# Patient Record
Sex: MALE | Race: BLACK OR AFRICAN AMERICAN | NOT HISPANIC OR LATINO | Employment: UNEMPLOYED | URBAN - METROPOLITAN AREA
[De-identification: names, ages, dates, MRNs, and addresses within clinical notes are randomized per-mention and may not be internally consistent; named-entity substitution may affect disease eponyms.]

---

## 2024-03-11 ENCOUNTER — HOSPITAL ENCOUNTER (OUTPATIENT)
Dept: RADIOLOGY | Facility: HOSPITAL | Age: 39
Discharge: HOME | End: 2024-03-11
Attending: PODIATRIST
Payer: OTHER GOVERNMENT

## 2024-03-11 ENCOUNTER — OFFICE VISIT (OUTPATIENT)
Dept: PODIATRY | Facility: CLINIC | Age: 39
End: 2024-03-11
Payer: OTHER GOVERNMENT

## 2024-03-11 DIAGNOSIS — Z78.9 UNDER CARE OF PAIN MANAGEMENT SPECIALIST: ICD-10-CM

## 2024-03-11 DIAGNOSIS — M54.17 LUMBOSACRAL RADICULOPATHY: Primary | ICD-10-CM

## 2024-03-11 DIAGNOSIS — M25.572 PAIN IN LEFT ANKLE AND JOINTS OF LEFT FOOT: ICD-10-CM

## 2024-03-11 DIAGNOSIS — M47.816 LUMBAR SPONDYLOSIS: ICD-10-CM

## 2024-03-11 DIAGNOSIS — L30.9 DERMATITIS: ICD-10-CM

## 2024-03-11 DIAGNOSIS — M54.17 LUMBOSACRAL RADICULOPATHY: ICD-10-CM

## 2024-03-11 DIAGNOSIS — M25.571 PAIN IN RIGHT ANKLE AND JOINTS OF RIGHT FOOT: ICD-10-CM

## 2024-03-11 DIAGNOSIS — M72.2 PLANTAR FASCIITIS: ICD-10-CM

## 2024-03-11 DIAGNOSIS — B35.3 TINEA PEDIS OF BOTH FEET: ICD-10-CM

## 2024-03-11 DIAGNOSIS — M51.36 BULGING OF LUMBAR INTERVERTEBRAL DISC: ICD-10-CM

## 2024-03-11 PROCEDURE — 99203 OFFICE O/P NEW LOW 30 MIN: CPT | Mod: PBBFAC,25 | Performed by: PODIATRIST

## 2024-03-11 PROCEDURE — 73630 X-RAY EXAM OF FOOT: CPT | Mod: TC,50

## 2024-03-11 PROCEDURE — 99999 PR PBB SHADOW E&M-NEW PATIENT-LVL III: CPT | Mod: PBBFAC,,, | Performed by: PODIATRIST

## 2024-03-11 PROCEDURE — 73630 X-RAY EXAM OF FOOT: CPT | Mod: 26,50,, | Performed by: RADIOLOGY

## 2024-03-11 PROCEDURE — 99205 OFFICE O/P NEW HI 60 MIN: CPT | Mod: S$PBB,,, | Performed by: PODIATRIST

## 2024-03-11 PROCEDURE — 72100 X-RAY EXAM L-S SPINE 2/3 VWS: CPT | Mod: 26,,, | Performed by: RADIOLOGY

## 2024-03-11 PROCEDURE — 72100 X-RAY EXAM L-S SPINE 2/3 VWS: CPT | Mod: TC

## 2024-03-11 RX ORDER — BETAMETHASONE VALERATE 1 MG/G
CREAM TOPICAL 2 TIMES DAILY
Qty: 45 G | Refills: 1 | Status: SHIPPED | OUTPATIENT
Start: 2024-03-11 | End: 2024-03-25

## 2024-03-11 RX ORDER — TERBINAFINE HYDROCHLORIDE 250 MG/1
250 TABLET ORAL DAILY
Qty: 42 TABLET | Refills: 0 | Status: SHIPPED | OUTPATIENT
Start: 2024-03-11 | End: 2024-04-22

## 2024-03-11 NOTE — PROGRESS NOTES
Subjective:       Patient ID: Zachariah Whitmore is a 38 y.o. male.    Chief Complaint: Foot Problem (Foot problem, rates pain 5, nondiabetic, )    HPI: Zachariah Whitmore presents to the clinic today with the complaint of persistent severe panis to the B/L LE, LLE greater than RLE. Also states burning and tingling to the B/L lower extremity. Patient states these symptoms have been on going now for the past several months, and are worsening. Patient states the symptoms seem to be moreso nocturnal. Pain/Discomfort is rated at approx. 5/10. Pain/Discomfort is described as an irritant, but is occasionally sharp and shooting like. Patient does have a history of lumbosacral pathology; spinal stenosis, radiculopathy, sciatica. Patient is not a DMI or DMII. Patient is currently on Gabapentin, but only takes the medication as needed. Is retired [employer]. States a history of left knee surgery. States a history of several LOREN to the LSpine. Patient states no trauma. No recent EMG/NCV is stated.     Review of patient's allergies indicates:  No Known Allergies    No past medical history on file.    No family history on file.    Social History     Socioeconomic History    Marital status: Single       No past surgical history on file.    Review of Systems   Constitutional:  Negative for chills, fatigue and fever.   HENT:  Negative for hearing loss.    Eyes:  Negative for photophobia and visual disturbance.   Respiratory:  Negative for cough, chest tightness, shortness of breath and wheezing.    Cardiovascular:  Negative for chest pain and palpitations.   Gastrointestinal:  Negative for constipation, diarrhea, nausea and vomiting.   Endocrine: Negative for cold intolerance and heat intolerance.   Genitourinary:  Negative for flank pain.   Musculoskeletal:  Positive for arthralgias and back pain. Negative for neck pain and neck stiffness.   Neurological:  Negative for light-headedness and headaches.        Neuritis    Psychiatric/Behavioral:  Negative  for sleep disturbance.           Objective:   There were no vitals taken for this visit.    No image results found.       Physical Exam    LOWER EXTREMITY PHYSICAL EXAMINATION  DERMATOLOGY: Skin is supple, dry and intact. Tinea pedis is noted    VASCULAR: The B/L LE dorsalis pedis pulse is 2/4 and the posterior tibial pulse is 2/4. Hair growth is noted on the dorsal foot and digits. Proximal to distal, warm to warm. Capillary refill time is WNL at less than 3s.    ORTHOPEDIC: There is mild tenderness to palpation of the area around the plantar medial calcaneal tubercle on the left foot. Pains are characterized as sharp and stabbing-like with direct palpation of the area. There is also mild pain to palpation of the immediate plantar aspect of the heel, and no pains to the lateral band of the fascia. No edema is noted. No fullness is noted. There are moderate pains to palpation within the plantar fascia at the arch. No defects are noted within the plantar fascia at the arch. No plantar fibromas are noted. No defects are noted within the ligament. Dorsiflexion of the MTPJs with simultaneous palpation of the fascia at the arch, does no worsen and exacerbate the pains. Plantarflexion of the MTPJs with simultaneous palpation of the fascia at the arch, does worsen and exacerbate the pains.  No pains with medial to lateral compression of the heel. Equinus contracture is noted. Antalgic gait pattern is noted.     NEUROLOGY: Proprioception is intact, bilateral. Sensation to light touch is intact. Positive Tinel's Sign/Valleix sign. No neurological sensations with compression of the area of Andrade's Nerve in the area of the Abductor Hallucis muscle belly.    Assessment:     1. Lumbosacral radiculopathy    2. Lumbar spondylosis    3. Bulging of lumbar intervertebral disc    4. Pain in left ankle and joints of left foot    5. Pain in right ankle and joints of right foot    6. Plantar fasciitis    7. Tinea pedis of both feet     8. Dermatitis    9. Under care of pain management specialist        Plan:     Lumbosacral radiculopathy  -     X-Ray Lumbar Spine 2 Or 3 Views; Future; Expected date: 03/11/2024  -     Nerve conduction test; Future; Expected date: 04/11/2024    Lumbar spondylosis  -     X-Ray Lumbar Spine 2 Or 3 Views; Future; Expected date: 03/11/2024  -     Nerve conduction test; Future; Expected date: 04/11/2024    Bulging of lumbar intervertebral disc    Pain in left ankle and joints of left foot  -     X-Ray Foot Complete Bilateral; Future; Expected date: 03/11/2024    Pain in right ankle and joints of right foot  -     X-Ray Foot Complete Bilateral; Future; Expected date: 03/11/2024    Plantar fasciitis  -     X-Ray Foot Complete Bilateral; Future; Expected date: 03/11/2024    Tinea pedis of both feet  -     terbinafine HCL (LAMISIL) 250 mg tablet; Take 1 tablet (250 mg total) by mouth once daily.  Dispense: 42 tablet; Refill: 0    Dermatitis  -     betamethasone valerate 0.1% (VALISONE) 0.1 % Crea; Apply topically 2 (two) times daily. for 14 days  Dispense: 45 g; Refill: 1    Under care of pain management specialist              Future Appointments   Date Time Provider Department Center   3/11/2024 11:45 AM JOSE DE JESUS WHITE-DR [facility]

## 2024-03-21 ENCOUNTER — OFFICE VISIT (OUTPATIENT)
Dept: PHYSICAL MEDICINE AND REHAB | Facility: CLINIC | Age: 39
End: 2024-03-21
Payer: OTHER GOVERNMENT

## 2024-03-21 VITALS — HEIGHT: 66 IN | BODY MASS INDEX: 30.53 KG/M2 | WEIGHT: 190 LBS | RESPIRATION RATE: 13 BRPM

## 2024-03-21 DIAGNOSIS — M54.17 LUMBOSACRAL RADICULOPATHY: ICD-10-CM

## 2024-03-21 DIAGNOSIS — M54.17 LUMBOSACRAL RADICULOPATHY: Primary | ICD-10-CM

## 2024-03-21 PROCEDURE — 95908 NRV CNDJ TST 3-4 STUDIES: CPT | Mod: 26,S$PBB,, | Performed by: PHYSICAL MEDICINE & REHABILITATION

## 2024-03-21 PROCEDURE — 95885 MUSC TST DONE W/NERV TST LIM: CPT | Mod: 26,S$PBB,, | Performed by: PHYSICAL MEDICINE & REHABILITATION

## 2024-03-21 PROCEDURE — 95886 MUSC TEST DONE W/N TEST COMP: CPT | Mod: PBBFAC | Performed by: PHYSICAL MEDICINE & REHABILITATION

## 2024-03-21 PROCEDURE — 99499 UNLISTED E&M SERVICE: CPT | Mod: S$PBB,,, | Performed by: PHYSICAL MEDICINE & REHABILITATION

## 2024-03-21 PROCEDURE — 95908 NRV CNDJ TST 3-4 STUDIES: CPT | Mod: PBBFAC | Performed by: PHYSICAL MEDICINE & REHABILITATION

## 2024-03-21 PROCEDURE — 99999 PR PBB SHADOW E&M-EST. PATIENT-LVL III: CPT | Mod: PBBFAC,,, | Performed by: PHYSICAL MEDICINE & REHABILITATION

## 2024-03-21 PROCEDURE — 99213 OFFICE O/P EST LOW 20 MIN: CPT | Mod: PBBFAC,25 | Performed by: PHYSICAL MEDICINE & REHABILITATION

## 2024-03-21 NOTE — PROGRESS NOTES
[facility]   [id number]  [place] [address]  Phone: [phone]        Full Name: Zachariah Whitmore YOB: 1985  Patient ID: [id number]      Visit Date: 3/21/2024 14:59  Age: 38 Years 6 Months Old  Examining Physician: Rylie Jensen M.D.  Referring Physician:   Reason for Referral: lower ext        Chief Complaint   Patient presents with    Back Pain     Into legs       HPI: This is a 38 y.o.  male being seen in clinic today for evaluation of bilateral lower ext pain with numbness/tingling/burning with radiating into his post leg to the bottom of his foot. His left leg is worse than right.  His ymptoms are worse with prolonged activity and at rest.  Injections provide relief for a while.    History obtained from patient    Past family, medical, social, and surgical history reviewed in chart    Review of Systems:     General- denies lethargy, weight change, fever, chills  Head/neck- denies swallowing difficulties  ENT- denies hearing changes  Cardiovascular-denies chest pain  Pulmonary- denies shortness of breath  GI- denies constipation or bowel incontinence  - denies bladder incontinence  Skin- denies wounds or rashes  Musculoskeletal- denies weakness, + pain  Neurologic- + numbness and tingling  Psychiatric- denies depressive or psychotic features, denies anxiety  Lymphatic-denies swelling  Endocrine- denies hypoglycemic symptoms/DM history  All other pertinent systems negative     Physical Examination:  General: Well developed, well nourished male, NAD  HEENT:NCAT EOMI bilaterally   Pulmonary:Normal respirations    Spinal Examination: CERVICAL  Active ROM is within normal limits.  Inspection: No deformity of spinal alignment.    Spinal Examination: LUMBAR or THORACIC  Active ROM is within normal limits.  Inspection: No deformity of spinal alignment.    Slr neg bilaterally    Bilateral Upper and Lower Extremities:  Pulses are 2+ at radial  bilaterally.  Shoulder/Elbow/Wrist/Hand ROM   Hip/Knee/Ankle ROM wnl  Bilateral Extremities show normal capillary refill.  No signs of cyanosis, rubor, edema, skin changes, or dysvascular changes of appendages.  Nails appear intact.    Neurological Exam:  Cranial Nerves:  II-XII grossly intact    Manual Muscle Testing: (Motor 5=normal)  5/5 strength bilateral lower extremities    No focal atrophy is noted of either  lower extremity.    Bilateral Reflexes:  Chanel's response is absent bilaterally.  No clonus at knee or ankle.    Sensation: tested to light touch  - intact in legs except dec at right lateral lower leg    Gait: Narrow base and good arm swing.      Entire procedure explained to patient prior to proceeding.  Verbal consent obtained      SNC      Nerve / Sites Rec. Site Onset Lat Peak Lat Amp Segments Distance Velocity     ms ms µV  mm m/s   L Sural - Ankle (Calf)      Calf Ankle 2.2 2.8 16.5 Calf - Ankle 140 64   L Superficial peroneal - Ankle      Lat leg Ankle 1.8 2.2 27.5 Lat leg - Ankle 140 77       MNC      Nerve / Sites Muscle Latency Amplitude Duration Rel Amp Segments Distance Lat Diff Velocity     ms mV ms %  mm ms m/s   L Peroneal - EDB      Ankle EDB 4.3 5.0 5.6 100 Ankle - EDB 80        Fibular head EDB 9.3 4.6 6.2 92.6 Fibular head - Ankle 310 4.9 63      Pop fossa EDB 10.4 4.5 6.0 98.9 Pop fossa - Fibular head 70 1.1 61   L Tibial - AH      Ankle AH 5.7 10.9 2.8 100 Ankle - Ankle 80        Pop fossa AH 13.3 7.2 2.7 66.3 Pop fossa - Ankle 400 7.6 53       EMG         EMG Summary Table     Spontaneous MUAP Recruitment   Muscle IA Fib PSW Fasc Other Dur. Dur Amp Dur Polys Pattern Effort   L. Rectus femoris N None None None .   N N N N Max   L. Extensor digitorum brevis N None None None .   N N 1+ sl red Max   L. Abductor hallucis Incr None None None .   N Sl Incr 1+ sl red Max   R. Extensor digitorum brevis Incr None None None .   N N 1+ sl red Max   R. Abductor hallucis Incr None 1+ None .    N Sl Incr 1+ sl red Max                              INTERPRETATION  -Left superficial peroneal sensory nerve conduction study showed normal peak latency and amplitude  -Left sural sensory nerve conduction study showed normal peak latency and amplitude  -Left peroneal motor nerve conduction study showed normal latency, amplitude, and conduction velocity  -Left tibial motor nerve conduction study showed normal latency, amplitude, and conduction velocity  -Needle EMG examination performed to above mentioned muscles       IMPRESSION  ABNORMAL study  2. There is electrodiagnostic evidence of an acute on chronic radiculopathy of the right S1 nerve root,a subacute on chronic radiculopathy of the right L5 and left S1 nerve root, and a chronic radiculopathy of the left L5 nerve root    PLAN  Discussed in detail for greater than 30 minutes about diagnosis and treatment plan    1. Follow up with referring provider: Dr. Wilian Padilla  2. Handouts on lumbar radic, exercises provided.  3. This study is good for one year. If symptoms worsen or do not improve, please re-consult.    Rylie Jensen M.D.  Physical Medicine and Rehab

## 2024-04-03 ENCOUNTER — CLINICAL SUPPORT (OUTPATIENT)
Dept: REHABILITATION | Facility: HOSPITAL | Age: 39
End: 2024-04-03
Attending: PODIATRIST
Payer: OTHER GOVERNMENT

## 2024-04-03 DIAGNOSIS — M54.17 LUMBOSACRAL RADICULOPATHY: ICD-10-CM

## 2024-04-03 DIAGNOSIS — M54.50 CHRONIC BILATERAL LOW BACK PAIN WITHOUT SCIATICA: ICD-10-CM

## 2024-04-03 DIAGNOSIS — G89.29 CHRONIC BILATERAL LOW BACK PAIN WITHOUT SCIATICA: ICD-10-CM

## 2024-04-03 PROCEDURE — 97161 PT EVAL LOW COMPLEX 20 MIN: CPT | Mod: PN

## 2024-04-03 PROCEDURE — 97112 NEUROMUSCULAR REEDUCATION: CPT | Mod: PN

## 2024-04-04 PROBLEM — M54.50 CHRONIC BILATERAL LOW BACK PAIN WITHOUT SCIATICA: Status: ACTIVE | Noted: 2024-04-04

## 2024-04-04 PROBLEM — G89.29 CHRONIC BILATERAL LOW BACK PAIN WITHOUT SCIATICA: Status: ACTIVE | Noted: 2024-04-04

## 2024-04-04 NOTE — PLAN OF CARE
[facility]   Physical Therapy Initial Evaluation      Date: 4/3/2024     Name: Zachariah Whitmore  Clinic Number: [id number]  Therapy Diagnosis:   Encounter Diagnosis   Name Primary?    Lumbosacral radiculopathy       Physician: Wilian Padilla DPM      Physician Orders: PT Eval and Treat  Medical Diagnosis from Referral: Lumbosacral Radiculopathy  Evaluation Date: 4/3/2024  Authorization Period Expiration: 03/22/2026  Plan of Care Expiration: 06/02/2024    Progress Update: 05/03/2024   Visit # / Visits authorized: 1 / 1   FOTO: 4/3/2024 - Scored: 1 / 3   FOTO  Number Date Score    1     2     3     4          PRECAUTIONS: Standard Precautions     PROBLEM LIST :   Left knee and leg pain  Bilateral low back pain     Time In: 1400  Time Out: 1450  Total Appointment Time (timed & untimed codes): 50 minutes    SUBJECTIVE     Date of onset: 03/11/2024  Chief Complaint: low back and left leg pain    History of current condition - Zachariah is a 38 y.o. male who presents to physical therapy reporting left leg pain with back pain.  States sitting and laying down makes it worse.      Imaging: [x] Xray [] MRI [] CT: Reviewed    Prior Therapy:  [] No  [x] Yes: Bone and joint after leg surgery  Social History: Pt lives alone [x] House [] Apartment/Condo []other  Stairs: [] No  [x] Yes: 1 flight  Occupation: Patient is a supervisor with minimal lifting  Prior Level of Function: Independent with all activities of daily living  Current Level of Function: Painful mobility and transitions make if difficult to work and perform ADLs    Pain:  Current 6/10, worst 9/10, best 0 /10   Location: [] Right [] Left [x] Bilateral: low back  Description: tight ache and heaviness  Aggravating Factors: sitting and side sleeping  Easing Factors: activity avoidance, rest, medication    Pts goals: Pt reported goals are to improve pain and function    _______________________________________________________  Medical History:   No past  medical history on file.    Surgical History:   Zachariah Whitmore  has no past surgical history on file.    Medications:   Zachariah has a current medication list which includes the following prescription(s): betamethasone valerate 0.1% and terbinafine hcl.    Allergies:   Review of patient's allergies indicates:  No Known Allergies       OBJECTIVE     Posture:  Pt presents with postural abnormalities which include:    [] Forward Head   [x] Increased Lumbar Lordosis   [] Rounded Shoulder   [] Flat Back Posture   [] Increased Thoracic Kyphosis [] Inominate Rotation   [] Increased Trunk Sway  [] Genu Recurvatum   [] Increased Trunk Rotation  [x] Pes Planus   [] Other:     Sensation:  Sensation is [x] Intact [] Impaired-- to light touch    Palpation: Increased tone and tenderness noted with palpation to: bilateral low back    LUMBAR-   Lumbar   Range of Motion Right  (spine) Left Function   & Pain Goal   Lumbar Flexion 100  []Functional  []Dysfunctional  []Painful  []Non-Painful 100%  Functional   Nonpainful   Lumbar Extension 100  []Functional  []Dysfunctional  []Painful  []Non-Painful 100%  Functional   Nonpainful   Lumbar Side Bending 100 100 []Functional  []Dysfunctional  []Painful  []Non-Painful 100%  Functional   Nonpainful   Lumbar Rotation 100 100 []Functional  []Dysfunctional  []Painful  []Non-Painful 100%  Functional   Nonpainful      HIP-   Hip   Range of Motion Right   Left   Goal   Hip Flexion (120º) 115 115 120º   Hip Abduction (45º) Not tested Not tested  30º   Hip Extension (20º) Not tested  Not tested  15º    (*) pain and/or dysfunction(*) pain and/or dysfunction      LE STRENGTH -   Lower Extremity  Strength RIGHT   Goal   Hip Flexion  []1  []2  []3  [x]4  []5                []+ []- 5/5 B    Hip Extension  []1  []2  []3  [x]4  []5                []+ []- 5/5 B   Hip Abduction  []1  []2  []3  [x]4  []5                []+ []- 5/5 B   Knee Flexion []1  []2  []3  []4  [x]5                []+ []- 5/5 B   Knee Extension  []1  []2  []3  []4  [x]5                []+ []- 5/5 B   Ankle Dorsiflexion []1  []2  []3  []4  [x]5                []+ []- 5/5 B   Ankle Plantarflexion []1  []2  []3  []4  [x]5                []+ []- 5/5 B     Lower Extremity  Strength LEFT   Goal   Hip Flexion  []1  []2  []3  [x]4  []5                []+ []- 5/5 B    Hip Extension  []1  []2  []3  [x]4  []5                []+ []- 5/5 B   Hip Abduction  []1  []2  []3  [x]4  []5                []+ []- 5/5 B   Knee Flexion []1  []2  []3  []4  [x]5                []+ []- 5/5 B   Knee Extension []1  []2  []3  []4  [x]5                []+ []- 5/5 B   Ankle Dorsiflexion []1  []2  []3  []4  [x]5                []+ []- 5/5 B   Ankle Plantarflexion []1  []2  []3  []4  [x]5                []+ []- 5/5 B      FUNCTION:     Intake Outcome Measure for FOTO Lumbar Spine Survey    Therapist reviewed FOTO scores for Zachariah Whitmore on 4/3/2024.   FOTO documents entered into [employer] - see Media section.    Intake Score: 42%         TREATMENT     Total Treatment time separate from Evaluation: (15) minutes    Zachariah received the following interventions:       Therapeutic exercises to develop strength, ROM, flexibility, posture, and core stabilization for --- minutes including: ·    Activity   Details                                       Manual therapy techniques: Joint mobilizations, Manual traction, Myofacial release, and Soft tissue Mobilization were applied to the: --- for --- minutes, including:     Activity   Details                                       Neuromuscular re-education activities to improve: Balance, Coordination, Kinesthetic, Sense, Proprioception, Posture, Core for 15 minutes. The following activities were included:    Activity   Details    Prone on elbows x     Prone pushups x                            Therapeutic activities to improve functional performance for ---  minutes, including:   Activity   Details                                            PATIENT EDUCATION AND HOME  EXERCISES     Education/Self-Care provided:  (included in treatment) minutes   Patient educated on the impairments noted above and the effects of physical therapy intervention to improve overall condition and Quality of Life  Patient was educated on all the above exercise prior/during/after for proper posture, positioning, and execution for safe performance with home exercise program.     Written Home Exercises Provided: yes. Prefers: [x] Printed [] Electronic  Exercises were reviewed and Zachariah was able to demonstrate them prior to the end of the session.  Zachariah demonstrated good understanding of the education provided. See EMR under Patient Instructions for exercises provided during therapy sessions.      ASSESSMENT     Zachariah is a 38 y.o. male referred to outpatient Physical Therapy with a medical diagnosis of   Encounter Diagnosis   Name Primary?    Lumbosacral radiculopathy    .    Physical exam supports low back and left lower extremity impairments including: decreased range of motion, decreased muscular strength, decreased endurance, decreased muscular length/flexibility, impaired joint mobility, and impaired functional mobility.     The above impairments will be addressed through manual therapy techniques, therapeutic exercises, functional training, and modalities as necessary. Patient was treated and educated on exercises for home program, progression of therapy, and benefits of therapy to achieve full functional mobility.       Pt prognosis is Good.   Pt will benefit from skilled outpatient Physical Therapy to address the deficits stated above and in the chart below, provide pt/family education, and to maximize pt's level of independence.     Plan of care discussed with patient: Yes  Pt's spiritual, cultural and educational needs considered and patient is agreeable to the plan of care and goals as stated below:     Anticipated Barriers for therapy: none    Medical Necessity is demonstrated by the following:      History  Co-morbidities and personal factors that may impact the plan of care [] LOW: no personal factors / co-morbidities  [] MODERATE: 1-2 personal factors / co-morbidities  [x] HIGH: 3+ personal factors / co-morbidities    Moderate / High Support Documentation:   Co-morbidities affecting plan of care:   No past medical history on file.    Personal Factors:   no deficits     Examination  Body Structures and Functions, activity limitations and participation restrictions that may impact the plan of care [x] LOW: addressing 1-2 elements  [] MODERATE: 3+ elements  [] HIGH: 4+ elements (please support below)    Moderate / High Support Documentation:   [] Head / Neck  [x] Spine  [] Upper Quarter  [x] Lower Quarter  [] Range of motion Deficits  [] Gross Symmetry Deficits  [] Strength Deficits  [] Balance Deficits  [] Gait Deficits  [] Unable to participate in daily activities  [] Unable to perform functional tasks  [] Unable to Care for Self or others  [] Community/ Social Life changes due to impairments     Clinical Presentation [] LOW: stable  [x] MODERATE: Evolving  [] HIGH: Unstable     Decision Making/ Complexity Score: low         SHORT TERM GOALS:  4 weeks  Progress Date Met   Recent signs and systems trend is improving in order to progress towards Long term goals.  [] Met  [] Not Met  [] Progressing    Patient will be independent with Home Exercise Program  in order to further progress and return to maximal function. [] Met  [] Not Met  [] Progressing    Pain rating at Worst: 5 /10 in order to progress towards increased independence with activity. [] Met  [] Not Met  [] Progressing    Patient will be able to correct postural deviations in sitting and standing, to decrease pain and promote postural awareness for injury prevention.  [] Met  [] Not Met  [] Progressing    Patient will improve functional outcome (FOTO) score: by 5% to increase self-worth & perceived functional ability towards long term goals []  Met  [] Not Met  [] Progressing      LONG TERM GOALS: 12 weeks  Progress Date Met   Patient will return to normal activites of daily living, recreational, and work related activities with less pain and limitation.  [] Met  [] Not Met  [] Progressing    Patient will improve range of motion  to stated goals in order to return to maximal functional potential.  [] Met  [] Not Met  [] Progressing    Patient will improve Strength to stated goals of appropriate musculature in order to improve functional independence.  [] Met  [] Not Met  [] Progressing    Pain Rating at Best: 1/10 to improve Quality of Life.  [] Met  [] Not Met  [] Progressing    Patient will meet predicted functional outcome (FOTO) score: 53% to increase self-worth & perceived functional ability. [] Met  [] Not Met  [] Progressing    Patient will have met/partially met personal goal of: improve pain and function  [] Met  [] Not Met  [] Progressing          PLAN   Plan of care Certification: 4/3/2024 to 06/02/2024    Outpatient Physical Therapy 2 times weekly for 8 weeks to include any combination of the following interventions: virtual visits, dry needling, modalities, electrical stimulation (IFC, Pre-Mod, Attended with Functional Dry Needling), Manual Therapy, Moist Heat/ Ice, Neuromuscular Re-ed, Patient Education, Self Care, Therapeutic Exercise, Functional Training, and Therapeutic Activites     Thank you for this referral.    Jerry Lassiter, PT